# Patient Record
Sex: FEMALE | Race: WHITE | NOT HISPANIC OR LATINO | URBAN - METROPOLITAN AREA
[De-identification: names, ages, dates, MRNs, and addresses within clinical notes are randomized per-mention and may not be internally consistent; named-entity substitution may affect disease eponyms.]

---
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
## 2018-10-01 NOTE — BEHAVIORAL HEALTH ASSESSMENT NOTE - HOMICIDALITY / AGGRESSION (CURRENT/PAST)
ventilation and respiration/gas exchange/gait, locomotion, and balance/aerobic capacity/endurance
None known in lifetime

## 2018-11-28 ENCOUNTER — INPATIENT (INPATIENT)
Facility: HOSPITAL | Age: 18
LOS: 8 days | Discharge: HOME | DRG: 885 | End: 2018-12-07
Attending: PSYCHIATRY & NEUROLOGY | Admitting: PSYCHIATRY & NEUROLOGY
Payer: MEDICAID

## 2018-11-28 VITALS
TEMPERATURE: 98 F | RESPIRATION RATE: 18 BRPM | HEART RATE: 94 BPM | HEIGHT: 64 IN | DIASTOLIC BLOOD PRESSURE: 84 MMHG | WEIGHT: 149.91 LBS | SYSTOLIC BLOOD PRESSURE: 127 MMHG

## 2018-11-28 LAB
ALBUMIN SERPL ELPH-MCNC: 4.9 G/DL — SIGNIFICANT CHANGE UP (ref 3.3–5)
ALP SERPL-CCNC: 92 U/L — SIGNIFICANT CHANGE UP (ref 40–120)
ALT FLD-CCNC: 20 U/L — SIGNIFICANT CHANGE UP (ref 10–45)
ANION GAP SERPL CALC-SCNC: 14 MMOL/L — SIGNIFICANT CHANGE UP (ref 5–17)
APPEARANCE UR: CLEAR — SIGNIFICANT CHANGE UP
AST SERPL-CCNC: 25 U/L — SIGNIFICANT CHANGE UP (ref 10–40)
BASOPHILS NFR BLD AUTO: 0.5 % — SIGNIFICANT CHANGE UP (ref 0–2)
BILIRUB SERPL-MCNC: <0.2 MG/DL — SIGNIFICANT CHANGE UP (ref 0.2–1.2)
BILIRUB UR-MCNC: NEGATIVE — SIGNIFICANT CHANGE UP
BUN SERPL-MCNC: 15 MG/DL — SIGNIFICANT CHANGE UP (ref 7–23)
CALCIUM SERPL-MCNC: 10.1 MG/DL — SIGNIFICANT CHANGE UP (ref 8.4–10.5)
CHLORIDE SERPL-SCNC: 98 MMOL/L — SIGNIFICANT CHANGE UP (ref 96–108)
CO2 SERPL-SCNC: 25 MMOL/L — SIGNIFICANT CHANGE UP (ref 22–31)
COLOR SPEC: YELLOW — SIGNIFICANT CHANGE UP
CREAT SERPL-MCNC: 0.77 MG/DL — SIGNIFICANT CHANGE UP (ref 0.5–1.3)
DIFF PNL FLD: NEGATIVE — SIGNIFICANT CHANGE UP
EOSINOPHIL NFR BLD AUTO: 2.8 % — SIGNIFICANT CHANGE UP (ref 0–6)
GLUCOSE SERPL-MCNC: 95 MG/DL — SIGNIFICANT CHANGE UP (ref 70–99)
GLUCOSE UR QL: NEGATIVE — SIGNIFICANT CHANGE UP
HBA1C BLD-MCNC: 4.7 % — SIGNIFICANT CHANGE UP (ref 4–5.6)
HCG SERPL-ACNC: <.1 MIU/ML — SIGNIFICANT CHANGE UP
HCT VFR BLD CALC: 38.8 % — SIGNIFICANT CHANGE UP (ref 34.5–45)
HGB BLD-MCNC: 13.1 G/DL — SIGNIFICANT CHANGE UP (ref 11.5–15.5)
KETONES UR-MCNC: NEGATIVE — SIGNIFICANT CHANGE UP
LEUKOCYTE ESTERASE UR-ACNC: ABNORMAL
LYMPHOCYTES # BLD AUTO: 25.2 % — SIGNIFICANT CHANGE UP (ref 13–44)
MCHC RBC-ENTMCNC: 29.6 PG — SIGNIFICANT CHANGE UP (ref 27–34)
MCHC RBC-ENTMCNC: 33.8 G/DL — SIGNIFICANT CHANGE UP (ref 32–36)
MCV RBC AUTO: 87.6 FL — SIGNIFICANT CHANGE UP (ref 80–100)
MONOCYTES NFR BLD AUTO: 6.5 % — SIGNIFICANT CHANGE UP (ref 2–14)
NEUTROPHILS NFR BLD AUTO: 65 % — SIGNIFICANT CHANGE UP (ref 43–77)
NITRITE UR-MCNC: NEGATIVE — SIGNIFICANT CHANGE UP
PH UR: 6 — SIGNIFICANT CHANGE UP (ref 5–8)
PLATELET # BLD AUTO: 469 K/UL — HIGH (ref 150–400)
POTASSIUM SERPL-MCNC: 4.5 MMOL/L — SIGNIFICANT CHANGE UP (ref 3.5–5.3)
POTASSIUM SERPL-SCNC: 4.5 MMOL/L — SIGNIFICANT CHANGE UP (ref 3.5–5.3)
PROT SERPL-MCNC: 8.6 G/DL — HIGH (ref 6–8.3)
PROT UR-MCNC: NEGATIVE MG/DL — SIGNIFICANT CHANGE UP
RBC # BLD: 4.43 M/UL — SIGNIFICANT CHANGE UP (ref 3.8–5.2)
RBC # FLD: 12.2 % — SIGNIFICANT CHANGE UP (ref 10.3–16.9)
SODIUM SERPL-SCNC: 137 MMOL/L — SIGNIFICANT CHANGE UP (ref 135–145)
SP GR SPEC: 1.02 — SIGNIFICANT CHANGE UP (ref 1–1.03)
TSH SERPL-MCNC: 0.89 UIU/ML — SIGNIFICANT CHANGE UP (ref 0.35–4.94)
UROBILINOGEN FLD QL: 0.2 E.U./DL — SIGNIFICANT CHANGE UP
WBC # BLD: 8.6 K/UL — SIGNIFICANT CHANGE UP (ref 3.8–10.5)
WBC # FLD AUTO: 8.6 K/UL — SIGNIFICANT CHANGE UP (ref 3.8–10.5)

## 2018-11-28 RX ORDER — LAMOTRIGINE 25 MG/1
225 TABLET, ORALLY DISINTEGRATING ORAL AT BEDTIME
Qty: 0 | Refills: 0 | Status: DISCONTINUED | OUTPATIENT
Start: 2018-11-28 | End: 2018-12-07

## 2018-11-28 RX ORDER — MAGNESIUM HYDROXIDE 400 MG/1
30 TABLET, CHEWABLE ORAL DAILY
Qty: 0 | Refills: 0 | Status: DISCONTINUED | OUTPATIENT
Start: 2018-11-28 | End: 2018-12-07

## 2018-11-28 RX ORDER — IBUPROFEN 200 MG
400 TABLET ORAL THREE TIMES A DAY
Qty: 0 | Refills: 0 | Status: DISCONTINUED | OUTPATIENT
Start: 2018-11-28 | End: 2018-12-07

## 2018-11-28 RX ORDER — HYDROXYZINE HCL 10 MG
25 TABLET ORAL THREE TIMES A DAY
Qty: 0 | Refills: 0 | Status: DISCONTINUED | OUTPATIENT
Start: 2018-11-28 | End: 2018-12-07

## 2018-11-28 RX ORDER — LAMOTRIGINE 25 MG/1
225 TABLET, ORALLY DISINTEGRATING ORAL ONCE
Qty: 0 | Refills: 0 | Status: COMPLETED | OUTPATIENT
Start: 2018-11-28 | End: 2018-11-28

## 2018-11-28 RX ADMIN — LAMOTRIGINE 225 MILLIGRAM(S): 25 TABLET, ORALLY DISINTEGRATING ORAL at 21:44

## 2018-11-29 DIAGNOSIS — F33.9 MAJOR DEPRESSIVE DISORDER, RECURRENT, UNSPECIFIED: ICD-10-CM

## 2018-11-29 DIAGNOSIS — F50.81 BINGE EATING DISORDER: ICD-10-CM

## 2018-11-29 DIAGNOSIS — F60.3 BORDERLINE PERSONALITY DISORDER: ICD-10-CM

## 2018-11-29 PROBLEM — F41.9 ANXIETY DISORDER, UNSPECIFIED: Chronic | Status: ACTIVE | Noted: 2017-09-05

## 2018-11-29 PROBLEM — F32.9 MAJOR DEPRESSIVE DISORDER, SINGLE EPISODE, UNSPECIFIED: Chronic | Status: ACTIVE | Noted: 2017-09-05

## 2018-11-29 LAB
CHOLEST SERPL-MCNC: 201 MG/DL — HIGH (ref 10–199)
HDLC SERPL-MCNC: 41 MG/DL — LOW
LIPID PNL WITH DIRECT LDL SERPL: 116 MG/DL — SIGNIFICANT CHANGE UP
TOTAL CHOLESTEROL/HDL RATIO MEASUREMENT: 4.9 RATIO — SIGNIFICANT CHANGE UP (ref 3.3–7.1)
TRIGL SERPL-MCNC: 222 MG/DL — HIGH (ref 10–149)

## 2018-11-29 PROCEDURE — 93010 ELECTROCARDIOGRAM REPORT: CPT

## 2018-11-29 PROCEDURE — 99233 SBSQ HOSP IP/OBS HIGH 50: CPT

## 2018-11-29 RX ADMIN — LAMOTRIGINE 225 MILLIGRAM(S): 25 TABLET, ORALLY DISINTEGRATING ORAL at 21:32

## 2018-11-29 NOTE — BEHAVIORAL HEALTH ASSESSMENT NOTE - NSBHCHARTREVIEWVS_PSY_A_CORE FT
Vital Signs Last 24 Hrs  T(C): 36.9 (29 Nov 2018 09:30), Max: 36.9 (28 Nov 2018 18:33)  T(F): 98.5 (29 Nov 2018 09:30), Max: 98.5 (29 Nov 2018 09:30)  HR: 71 (29 Nov 2018 09:30) (67 - 94)  BP: 100/65 (29 Nov 2018 09:30) (96/62 - 127/84)  BP(mean): --  RR: 18 (29 Nov 2018 09:30) (18 - 18)  SpO2: --

## 2018-11-29 NOTE — BEHAVIORAL HEALTH ASSESSMENT NOTE - SUMMARY
Patient reports depression for the last 5 years, "life was a mess". 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 The director of the program told her she was "fat", which was the time she left the academy (age 14) and moved to [place] with her family. Her parents were supportive and understanding during her academy stay, reports that she rivera by talking to her parents. While in [place], she tried to audition but kept failing and being rejected. States that it "brought terror to life" and was "scared of the future and not being a dancer". First thought of suicide last year (2017). States that she "just wanted to die" but didn't do anything about it, admitted herself to the hospital ([facility] psych unit). Reports that her parents were in [place] at the time of suicidal ideation in 2017. During her visit last year, she reports being happy here and "away from reality". Pt states she has a binge eating disorder that developed from the dance academy, reports that she eats mounds of food to "fill a void", has tried to vomit but cannot get herself to. Void is fear, fact that wasn't dancing anymore and "hated myself". Pt attended an eating disorder inpatient unit through [facility] after completing her stay here in 2017. States that she does not binge as much anymore. After failing auditions, she continued to dance by herself or with her mother who is a choreographer. States 2 months ago she couldn't physically dance anymore, "grief that I can't describe". Reports that she sees an outpatient psychiatrist , states for the past 2 months she has been telling everyone that she felt suicidal daily and was imagining ways of dying. When asked about this statement, she replies "I don't really want to die but don't want to feel pain anymore". She reports she knew her parents were coming back shortly after the overdose and that they would bring her to the hospital. When asked about the suicide attempt, she states "I wanted to get revenge; I felt the need to hurt myself since I have been hating myself". Pt did not plan the suicide attempt, states it was “impulsive but in my head for so long”. She knew her parents were about to leave for [place] again. States that she felt it was a surprise to her parents but “not really because I’ve been telling everyone I want to die”. States, "I wanted to get help but I did it in a stupid way which I'm regretting. I was relieved when I woke up, happy, I didn't hate myself anymore". 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 States that the families do know that she is in the hospital but concerned about if they message her while she is here that she will disappoint them. When talking about babysitting, states “it’s amazing, I love it so much”. When asked why she enjoys babysitting, she reports it to be “therapeutic, kids are so innocent and young. I can leave my problems behind”. Denies  illicit drug use. Reports consuming 2 alcoholic drinks once a month. States she has 3 good friends in [place] that are in her building and many other friends in [facility] that she is in contact with. Patient believes that a lack of structure is a trigger for her, she used to be very structured at the dance academy but when she left she felt that life fell apart. States that while she has been in the hospital, her parents have helped her with a plan once she leaves here. She will continue to work every day and she will be meeting with a . Parents have agreed to order her meals in order to keep her structured with scheduled eating since pt reports that her binge eating is mostly from snacking. She plans on going to the dance studio with her mother, states it will be an unstructured environment that she can come and go as she pleases and dance as much or as little as she wants. Pt is discharge focused. States that she “feels ready to leave today but I know I need to suffer the consequences for my actions”. Believes her suicide attempt was the “last moment fight that ended the war” referring to the war with hating herself. States “I did something horrible by punishing myself and satisfied that it’s over. The future is open.” and does not feel the need to overdose again. Reports she is “finally at truce with myself”. States her “whole mindset has changed” and believes talking about the incident will help. She is happy with her outpatient psychiatrist and counselor. Reports that she doesn’t think Lamictal is helping but “pills” have not helped her in the past as she states she has tried many including Prozac and Sertraline. States talking helps her the most. She is interested in outpatient eating group/program “just to be safe” because binge eating becomes a problem when things get hectic in her life. Denies SI/HI. Denies AH/VH.

## 2018-11-29 NOTE — BEHAVIORAL HEALTH ASSESSMENT NOTE - NSBHADMITIPSTRENGTH_PSY_A_CORE
In good physical health/Has supportive interpersonal relationships with family, friends or peers/Has access to housing/residential stability/Knowledge of medications/Cooperative with treatment/Steady employment

## 2018-11-29 NOTE — BEHAVIORAL HEALTH ASSESSMENT NOTE - RISK ASSESSMENT
Static: history of borderline personality disorder, history of depression, history of binge eating disorder, history of past psychiatric hospitalizations, history of suidical ideation, history of suicide attempt  Modifiable: mood symptoms, structured eating environment  Protective: supportive family, employment, domiciled, insight into illness

## 2018-11-29 NOTE — BEHAVIORAL HEALTH ASSESSMENT NOTE - DETAILS
Prior suicidal ideation 2017, patient admitted herself to [facility] psych unit. 11/22/18 suicide attempt, overdose on Lamictal and Sertraline, found by parents and brought to hospital. Verbal abuse from director of ballet academy, called patient fat

## 2018-11-29 NOTE — BEHAVIORAL HEALTH ASSESSMENT NOTE - NSBHCHARTREVIEWLAB_PSY_A_CORE FT
Cholesterol <H> 201  Triglycerides <H> 222  HDL <L> 41  Direct   Platelet count <H> 469  Urine- 5-10 WBC, many bacteria, trace leukocytes

## 2018-11-29 NOTE — BEHAVIORAL HEALTH ASSESSMENT NOTE - NSBHCHARTREVIEWIMAGING_PSY_A_CORE FT
[facility] imaging   XR chest- no acute airspace disease  CT head without contrast- no evidence of intracranial hemorrhage, extra-axial fluid collection or territorial infarct

## 2018-11-29 NOTE — BEHAVIORAL HEALTH ASSESSMENT NOTE - HPI (INCLUDE ILLNESS QUALITY, SEVERITY, DURATION, TIMING, CONTEXT, MODIFYING FACTORS, ASSOCIATED SIGNS AND SYMPTOMS)
18 year old single female, domiciled and employed (), history of borderline personality disorder, history of depression, history of binge eating, past psychiatric hospitalizations (voluntary in 2017 for suicidal ideation). Patient was found drowsy and surrounded by vomitus and empty pill bottles (Sertraline/Lamotrigine- dose and number of pills consumed unclear) around 2:30pm on 11/22/18. Had been last seen normal around 1pm. Patient reports that Sertraline was an old medication that had been prescribed to her, had pills left over. States she took "half a handful" of pills. She was brought to the [facility] ED within an hour of being found, all vitals were within normal limits except patient was hypothermic to 95.4F. She was found to have muscle rigidity and clonus. Poison control was contacted and she was administered 50mg Activated charcoal, 8mg Cyproheptadine and 3L of normal saline. During the next few hours after presentation, patient progressively became more drowsy, tachycardic, and tachypneic so decision was made to intubate her for airway protection around 5:30pm on 11/22/18. Patient was admitted to the ICU after being intubated in the ED and given 50mg Activated charcoal, cyproheptadine and 3L of normal saline. She was started on Propofol gtt and then Versed gtt. Post intubation, ABG showed pH 7.22. Utox was negative. Lactic acid peaked at 5.3. The first morning of admission, the patient had minimal mechanical vent requirements, no additional Cyproheptadine was given. Sedation was waned, but patient was not extubated as myoclonus remained prominent. EEG showed no definitive epileptiform discharges. Leukocytosis downtrended appropriately. VBG pH 7.22>7.41>7.41. The patient was extubated her second morning of admission 11/24 without complication. Patient was comfortable on room air post extubation and was encouraged to continue incentive spirometry. Blood cultures were sent as patient met SIRS criteria on 11/22/18- negative to date. Initial HAGMA with lactic acidosis, gap closed. Patient was restarted on Lamictal 225mg PO on 11/27/198. She clinically improved during [facility] stay and was medically stable for discharge to [facility]. See summary for further detail about psychiatric history and admission.

## 2018-11-29 NOTE — CHART NOTE - NSCHARTNOTEFT_GEN_A_CORE
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      On evaluation, patient appears pleasant with a bright affect.  She regrets about her OD.  She is happy that she is alive.  Patient minimized her OD and rationalized it was her way of taking control of her care and crying for help.  Now she feels much better since the family and her outpatient team have come up with structures and plans for her.  Her mood is "happy".  She denies psychotic symptoms.  Sleep and appetite are good.     MSE: Well groomed, pleasant, cooperative with good eye contact.  Mood is "happy", bright affect.   Thought content intact.  Thought process linear, goal directed, No hallucination or delusions.       She had been getting lamictal 225mg bedtime when at [facility].  Will C/W it.

## 2018-11-29 NOTE — BEHAVIORAL HEALTH ASSESSMENT NOTE - NSBHADMITCOUNSEL_PSY_A_CORE
risk factor reduction/risks and benefits of treatment options/diagnostic results/impressions and/or recommended studies/importance of adherence to chosen treatment

## 2018-11-30 PROCEDURE — 99232 SBSQ HOSP IP/OBS MODERATE 35: CPT

## 2018-11-30 RX ADMIN — LAMOTRIGINE 225 MILLIGRAM(S): 25 TABLET, ORALLY DISINTEGRATING ORAL at 21:30

## 2018-11-30 RX ADMIN — Medication 25 MILLIGRAM(S): at 21:30

## 2018-11-30 NOTE — PROGRESS NOTE BEHAVIORAL HEALTH - RISK ASSESSMENT
Static: history of borderline personality disorder, history of depression, history of binge eating disorder, history of past psychiatric hospitalizations, history of suidical ideation, history of suicide attempt  Modifiable: mood symptoms, structured eating environment  Protective: supportive family, employment, domiciled, insight into illness. Static: history of borderline personality disorder, history of depression, history of binge eating disorder, history of past psychiatric hospitalizations, history of suicidal ideation, history of suicide attempt  Modifiable: mood symptoms, structured eating environment  Protective: supportive family, employment, domiciled, insight into illness.

## 2018-12-01 RX ADMIN — LAMOTRIGINE 225 MILLIGRAM(S): 25 TABLET, ORALLY DISINTEGRATING ORAL at 21:22

## 2018-12-01 RX ADMIN — Medication 25 MILLIGRAM(S): at 21:22

## 2018-12-01 NOTE — PROGRESS NOTE BEHAVIORAL HEALTH - RISK ASSESSMENT
Static: history of borderline personality disorder, history of depression, history of binge eating disorder, history of past psychiatric hospitalizations, history of suicidal ideation, history of suicide attempt  Modifiable: mood symptoms, structured eating environment  Protective: supportive family, employment, domiciled, insight into illness.

## 2018-12-02 RX ADMIN — LAMOTRIGINE 225 MILLIGRAM(S): 25 TABLET, ORALLY DISINTEGRATING ORAL at 21:27

## 2018-12-02 RX ADMIN — Medication 25 MILLIGRAM(S): at 21:27

## 2018-12-03 PROCEDURE — 99232 SBSQ HOSP IP/OBS MODERATE 35: CPT

## 2018-12-03 RX ADMIN — LAMOTRIGINE 225 MILLIGRAM(S): 25 TABLET, ORALLY DISINTEGRATING ORAL at 21:16

## 2018-12-03 NOTE — PROGRESS NOTE BEHAVIORAL HEALTH - RISK ASSESSMENT
Risk elements: domiciled with family; suicidality (current/past) yes; voluntary; verbal; employment employed; abuse / trauma history yes; oriented to situation yes; overdose; alcohol yes; -mother; -family;     Static: past suicide attempts; interpersonal stress;; past trauma or abuse; substance abuse/dependance;     Modifiable: treat underlying mood issues; imptove interpersonal effectiveness;; address possible PTSD or trauma issues monitor for flashbacks or nightmares; assess/treat underlying mood issues; substance abuse counselling and treatment and need for referral; assist engaging possible support network;     Protective: involved family; seeks help; has job or job skills; cognitively able to engage in treatment; may have involved family; review for degree of family engagement;   Modifiable factors addressed in treatment plan; see summary and interval data for updates

## 2018-12-04 PROCEDURE — 99232 SBSQ HOSP IP/OBS MODERATE 35: CPT

## 2018-12-04 RX ADMIN — Medication 25 MILLIGRAM(S): at 21:28

## 2018-12-04 RX ADMIN — LAMOTRIGINE 225 MILLIGRAM(S): 25 TABLET, ORALLY DISINTEGRATING ORAL at 21:28

## 2018-12-05 PROCEDURE — 99232 SBSQ HOSP IP/OBS MODERATE 35: CPT

## 2018-12-05 RX ADMIN — Medication 25 MILLIGRAM(S): at 22:04

## 2018-12-05 RX ADMIN — Medication 400 MILLIGRAM(S): at 22:03

## 2018-12-05 RX ADMIN — LAMOTRIGINE 225 MILLIGRAM(S): 25 TABLET, ORALLY DISINTEGRATING ORAL at 22:03

## 2018-12-05 RX ADMIN — Medication 400 MILLIGRAM(S): at 22:04

## 2018-12-06 PROCEDURE — 99232 SBSQ HOSP IP/OBS MODERATE 35: CPT

## 2018-12-06 RX ADMIN — LAMOTRIGINE 225 MILLIGRAM(S): 25 TABLET, ORALLY DISINTEGRATING ORAL at 21:24

## 2018-12-06 RX ADMIN — Medication 25 MILLIGRAM(S): at 21:24

## 2018-12-07 VITALS
DIASTOLIC BLOOD PRESSURE: 85 MMHG | RESPIRATION RATE: 19 BRPM | SYSTOLIC BLOOD PRESSURE: 126 MMHG | HEART RATE: 76 BPM | TEMPERATURE: 98 F

## 2018-12-07 PROCEDURE — 99238 HOSP IP/OBS DSCHRG MGMT 30/<: CPT

## 2018-12-07 PROCEDURE — 90853 GROUP PSYCHOTHERAPY: CPT

## 2018-12-07 RX ORDER — LAMOTRIGINE 25 MG/1
9 TABLET, ORALLY DISINTEGRATING ORAL
Qty: 135 | Refills: 0 | OUTPATIENT
Start: 2018-12-07 | End: 2018-12-21

## 2018-12-07 NOTE — PROGRESS NOTE BEHAVIORAL HEALTH - PROBLEM SELECTOR PLAN 3
See interval history and summary for further detail.

## 2018-12-07 NOTE — PROGRESS NOTE BEHAVIORAL HEALTH - NSBHATTESTSEENBY_PSY_A_CORE
Trainee with telephonic supervision from Attending Psychiatrist
Trainee with telephonic supervision from Attending Psychiatrist
attending Psychiatrist without NP/Trainee

## 2018-12-07 NOTE — PROGRESS NOTE BEHAVIORAL HEALTH - NSBHFUPINTERVALHXFT_PSY_A_CORE
continues to appear "bright " and visible;  MSE: attending groups; lying in bed but rousale;  smiles occassionally; generally cheerful but anxious .  ; fair eye contact.  clean.   cognition is intact; speech normal volume, spontaneous, clear.   no tremor or rigidity.  mood anxious; affect anxious and normal range of affect;  .  mildly anxious mood and affect; affect generally  thought congruent; tc/tp no peculiarities of language use; i&j: fair to poor; accepts treatment; however not reflective on sxs or situation in a meaningful manner.  rather superficial.
No acute events overnight. Patient assessed at bedside per her request. She slept "good" and breakfast was "fine." She stated "I want to get out and start my new structure that my parents set up for me." She denied questions/concerns for MD. Denied Si, HI, AVH, medication side effects.
No acute events overnight. Patient assessed at bedside. patient says that ever since she woke up at the other hosopital, she has felt stable. She explained that this hospitalization is a time for her to take a step back and rest, before fany gback into the world with the structure she has made for herself. She denies any depressive feelings or suicidal thoughts. She has been going to groups and eating meals with good appetite. She has no complaints today, although continues to say that the medication does nothing for her.
continues to appear "bright " and visible;  MSE: attending groups; generally cheerful but anxious .  ; fair eye contact.  clean.   cognition is intact; speech normal volume, spontaneous, clear.   no tremor or rigidity.  mood anxious; affect anxious and normal range of affect;  .  mildly anxious mood and affect; affect generally  thought congruent; tc/tp no peculiarities of language use; i&j: fair to poor; accepts treatment; however not reflective on sxs or situation in a meaningful manner.  rather superficial.
continues to appear "bright " and visible;  MSE: in the conference room with the assigned psychologist.  ; fair eye contact.  clean.   cognition is intact; speech normal volume, spontaneous, clear.   no tremor or rigidity.  mood anxious; affect anxious and normal range of affect;  .  mildly anxious mood and affect; affect generally  thought congruent; tc/tp no peculiarities of language use; i&j: fair to poor; accepts treatment; however not reflective on sxs or situation in a meaningful manner.  rather superficial.
"bright " and visible; participated in conference.   MSE: attending groups; spends time with peers; a little anxious; pleasant. ;  smiles occassionally; generally cheerful but anxious .  ; fair eye contact.  clean.   cognition is intact; speech normal volume, spontaneous, clear.   no tremor or rigidity.  mood anxious; affect anxious and normal range of affect;  .  mildly anxious mood and affect; affect generally  thought congruent; tc/tp no peculiarities of language use; i&j: fair to poor; accepts treatment; however not reflective on sxs or situation in a meaningful manner.  rather superficial.      ;;
"bright " and visible; participated in conference.   MSE: attending groups; spends time with peers; a little anxious; pleasant. ;  smiles occassionally; generally cheerful but anxious .  ; fair eye contact.  clean.   cognition is intact; speech normal volume, spontaneous, clear.   no tremor or rigidity.  mood anxious; affect anxious and normal range of affect;  .  mildly anxious mood and affect; affect generally  thought congruent; tc/tp no peculiarities of language use; i&j: fair to poor; accepts treatment; however not reflective on sxs or situation in a meaningful manner.  rather superficial.
Admission note- Patient reports depression for the last 5 years, "life was a mess". 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 She attended a ballet academy in [facility] from age 11 to 14. The director of the program told her she was "fat", which was the time she left the academy (age 14) and moved to [place] with her family. Her parents were supportive and understanding during her academy stay, reports that she rivera by talking to her parents. While in [place], she tried to audition but kept failing and being rejected. States that it "brought terror to life" and was "scared of the future and not being a dancer". First thought of suicide last year (2017). States that she "just wanted to die" but didn't do anything about it, admitted herself to the hospital ([facility] psych unit). Reports that her parents were in [place] at the time of suicidal ideation in 2017. During her visit last year, she reports being happy here and "away from reality". Pt states she has a binge eating disorder that developed from the dance academy, reports that she eats mounds of food to "fill a void", has tried to vomit but cannot get herself to. Void is fear, fact that wasn't dancing anymore and "hated myself". Pt attended an eating disorder inpatient unit through [facility] after completing her stay here in 2017. States that she does not binge as much anymore. After failing auditions, she continued to dance by herself or with her mother who is a choreographer. States 2 months ago she couldn't physically dance anymore, "grief that I can't describe". Reports that she sees an outpatient psychiatrist , states for the past 2 months she has been telling everyone that she felt suicidal daily and was imagining ways of dying. When asked about this statement, she replies "I don't really want to die but don't want to feel pain anymore". She reports she knew her parents were coming back shortly after the overdose and that they would bring her to the hospital. When asked about the suicide attempt, she states "I wanted to get revenge; I felt the need to hurt myself since I have been hating myself". Pt did not plan the suicide attempt, states it was “impulsive but in my head for so long”. She knew her parents were about to leave for [place] again. States that she felt it was a surprise to her parents but “not really because I’ve been telling everyone I want to die”. States, "I wanted to get help but I did it in a stupid way which I'm regretting. I was relieved when I woke up, happy, I didn't hate myself anymore". 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 States that the families do know that she is in the hospital but concerned about if they message her while she is here that she will disappoint them. When talking about babysitting, states “it’s amazing, I love it so much”. When asked why she enjoys babysitting, she reports it to be “therapeutic, kids are so innocent and young. I can leave my problems behind”. Denies  illicit drug use. Reports consuming 2 alcoholic drinks once a month. States she has 3 good friends in [place] that are in her building and many other friends in [facility] that she is in contact with. Patient believes that a lack of structure is a trigger for her, she used to be very structured at the dance academy but when she left she felt that life fell apart. States that while she has been in the hospital, her parents have helped her with a plan once she leaves here. She will continue to work every day and she will be meeting with a . Parents have agreed to order her meals in order to keep her structured with scheduled eating since pt reports that her binge eating is mostly from snacking. She plans on going to the dance studio with her mother, states it will be an unstructured environment that she can come and go as she pleases and dance as much or as little as she wants. Pt is discharge focused. States that she “feels ready to leave today but I know I need to suffer the consequences for my actions”. Believes her suicide attempt was the “last moment fight that ended the war” referring to the war with hating herself. States “I did something horrible by punishing myself and satisfied that it’s over. The future is open.” and does not feel the need to overdose again. Reports she is “finally at truce with myself”. States her “whole mindset has changed” and believes talking about the incident will help. She is happy with her outpatient psychiatrist and counselor. Reports that she doesn’t think Lamictal is helping but “pills” have not helped her in the past as she states she has tried many including Prozac and Sertraline. States talking helps her the most. She is interested in outpatient eating group/program “just to be safe” because binge eating becomes a problem when things get hectic in her life. Denies SI/HI. Denies AH/VH.

## 2018-12-07 NOTE — PROGRESS NOTE BEHAVIORAL HEALTH - NSBHPTASSESSDT_PSY_A_CORE
01-Dec-2018 10:30
02-Dec-2018 11:00
03-Dec-2018 10:43
04-Dec-2018 14:28
06-Dec-2018 10:16
07-Dec-2018 11:03
30-Nov-2018 09:00
05-Dec-2018 09:04

## 2018-12-07 NOTE — PROGRESS NOTE BEHAVIORAL HEALTH - NSBHCHARTREVIEWVS_PSY_A_CORE FT
Vital Signs Last 24 Hrs  T(C): 36.4 (04 Dec 2018 16:17), Max: 36.8 (04 Dec 2018 06:23)  T(F): 97.6 (04 Dec 2018 16:17), Max: 98.2 (04 Dec 2018 06:23)  HR: 80 (04 Dec 2018 16:17) (79 - 84)  BP: 125/77 (04 Dec 2018 16:17) (91/53 - 125/77)  BP(mean): --  RR: 18 (04 Dec 2018 16:17) (18 - 18)  SpO2: --
Vital Signs Last 24 Hrs  T(C): 36.4 (04 Dec 2018 16:17), Max: 36.8 (04 Dec 2018 06:23)  T(F): 97.6 (04 Dec 2018 16:17), Max: 98.2 (04 Dec 2018 06:23)  HR: 80 (04 Dec 2018 16:17) (79 - 84)  BP: 125/77 (04 Dec 2018 16:17) (91/53 - 125/77)  BP(mean): --  RR: 18 (04 Dec 2018 16:17) (18 - 18)  SpO2: --
Vital Signs Last 24 Hrs  T(C): 36.8 (01 Dec 2018 09:34), Max: 36.9 (30 Nov 2018 20:58)  T(F): 98.3 (01 Dec 2018 09:34), Max: 98.4 (30 Nov 2018 20:58)  HR: 75 (01 Dec 2018 09:34) (75 - 89)  BP: 98/61 (01 Dec 2018 09:34) (98/61 - 115/76)  BP(mean): --  RR: 20 (01 Dec 2018 09:34) (18 - 20)  SpO2: --
Vital Signs Last 24 Hrs  T(C): 36.7 (30 Nov 2018 06:46), Max: 36.8 (29 Nov 2018 20:58)  T(F): 98.1 (30 Nov 2018 06:46), Max: 98.2 (29 Nov 2018 20:58)  HR: 84 (30 Nov 2018 06:46) (83 - 88)  BP: 110/71 (30 Nov 2018 06:46) (109/70 - 115/72)  BP(mean): --  RR: 18 (30 Nov 2018 06:46) (18 - 18)  SpO2: --
wnl --
Vital Signs Last 24 Hrs  T(C): 36.4 (04 Dec 2018 16:17), Max: 36.8 (04 Dec 2018 06:23)  T(F): 97.6 (04 Dec 2018 16:17), Max: 98.2 (04 Dec 2018 06:23)  HR: 80 (04 Dec 2018 16:17) (79 - 84)  BP: 125/77 (04 Dec 2018 16:17) (91/53 - 125/77)  BP(mean): --  RR: 18 (04 Dec 2018 16:17) (18 - 18)  SpO2: --

## 2018-12-07 NOTE — PROGRESS NOTE BEHAVIORAL HEALTH - PRIMARY DX
Episode of recurrent major depressive disorder, unspecified depression episode severity

## 2018-12-07 NOTE — PROGRESS NOTE BEHAVIORAL HEALTH - NSBHADMITDANGERSELF_PSY_A_CORE
suicidal ideation with plan and means

## 2018-12-07 NOTE — PROGRESS NOTE BEHAVIORAL HEALTH - SUMMARY
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   On evaluation, patient appears pleasant with a bright affect.  She regrets about her OD.  She is happy that she is alive.  Patient minimized her OD and rationalized it was her way of taking control of her care and crying for help.  Now she feels much better since the family and her outpatient team have come up with structures and plans for her.  Her mood is "happy".  She denies psychotic symptoms.  Sleep and appetite are good.  MSE: Well groomed, pleasant, cooperative with good eye contact.  Mood is "happy", bright affect.   Thought content intact.  Thought process linear, goal directed, No hallucination or delusions.   She had been getting lamictal 225mg bedtime when at [facility].  Will C/W it.    ;;11/29: anxious but no active SI.  No insight into overdose and possible relationship to parents going away on a trip.    ;;11/30: On Lamictal 225mg a day to prevent depressed mood; no SI today;  may begin transition to aftercare if stable.   ;;12/3: anticipating aftercare now that parents are back;  more anxious not sad;  no SI;    ;;12/4: attends groups; anxious but no SI.   ;;12/5: continues anxious but no SI.   ;;12/6: participated in conference; waiting for Eating Disorders Program; in good behavioral control.   ;;12/7: bright and cheerful;  no SI; affect generally thought congruent; appropriate for d/c today.
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   On evaluation, patient appears pleasant with a bright affect.  She regrets about her OD.  She is happy that she is alive.  Patient minimized her OD and rationalized it was her way of taking control of her care and crying for help.  Now she feels much better since the family and her outpatient team have come up with structures and plans for her.  Her mood is "happy".  She denies psychotic symptoms.  Sleep and appetite are good.  MSE: Well groomed, pleasant, cooperative with good eye contact.  Mood is "happy", bright affect.   Thought content intact.  Thought process linear, goal directed, No hallucination or delusions.   She had been getting lamictal 225mg bedtime when at [facility].  Will C/W it.    ;;11/29: anxious but no active SI.  No insight into overdose and possible relationship to parents going away on a trip.    ;;11/30: On Lamictal 225mg a day to prevent depressed mood; no SI today;  may begin transition to aftercare if stable.   ;;12/3: anticipating aftercare now that parents are back;  more anxious not sad;  no SI;    ;;12/4: attends groups; anxious but no SI.   ;;12/5: continues anxious but no SI.   ;;12/6: participated in conference; waiting for Eating Disorders Program; in good behavioral control.
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   On evaluation, patient appears pleasant with a bright affect.  She regrets about her OD.  She is happy that she is alive.  Patient minimized her OD and rationalized it was her way of taking control of her care and crying for help.  Now she feels much better since the family and her outpatient team have come up with structures and plans for her.  Her mood is "happy".  She denies psychotic symptoms.  Sleep and appetite are good.  MSE: Well groomed, pleasant, cooperative with good eye contact.  Mood is "happy", bright affect.   Thought content intact.  Thought process linear, goal directed, No hallucination or delusions.   She had been getting lamictal 225mg bedtime when at [facility].  Will C/W it.    ;;11/29: anxious but no active SI.  No insight into overdose and possible relationship to parents going away on a trip.    ;;11/30: On Lamictal 225mg a day to prevent depressed mood; no SI today;  may begin transition to aftercare if stable.   ;;12/3: anticipating aftercare now that parents are back;  more anxious not sad;  no SI;    ;;12/4: attends groups; anxious but no SI.
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   On evaluation, patient appears pleasant with a bright affect.  She regrets about her OD.  She is happy that she is alive.  Patient minimized her OD and rationalized it was her way of taking control of her care and crying for help.  Now she feels much better since the family and her outpatient team have come up with structures and plans for her.  Her mood is "happy".  She denies psychotic symptoms.  Sleep and appetite are good.  MSE: Well groomed, pleasant, cooperative with good eye contact.  Mood is "happy", bright affect.   Thought content intact.  Thought process linear, goal directed, No hallucination or delusions.   She had been getting lamictal 225mg bedtime when at [facility].  Will C/W it.    ;;11/29: anxious but no active SI.  No insight into overdose and possible relationship to parents going away on a trip.    ;;11/30: On Lamictal 225mg a day to prevent depressed mood; no SI today;  may begin transition to aftercare if stable.   ;;12/3: anticipating aftercare now that parents are back;  more anxious not sad;  no SI;
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   On evaluation, patient appears pleasant with a bright affect.  She regrets about her OD.  She is happy that she is alive.  Patient minimized her OD and rationalized it was her way of taking control of her care and crying for help.  Now she feels much better since the family and her outpatient team have come up with structures and plans for her.  Her mood is "happy".  She denies psychotic symptoms.  Sleep and appetite are good.  MSE: Well groomed, pleasant, cooperative with good eye contact.  Mood is "happy", bright affect.   Thought content intact.  Thought process linear, goal directed, No hallucination or delusions.   She had been getting lamictal 225mg bedtime when at [facility].  Will C/W it.    ;;11/29: anxious but no active SI.  No insight into overdose and possible relationship to parents going away on a trip.    ;;11/30: On Lamictal 225mg a day to prevent depressed mood; no SI today;  may begin transition to aftercare if stable.   ;;12/3: anticipating aftercare now that parents are back;  more anxious not sad;  no SI;    ;;12/4: attends groups; anxious but no SI.   ;;12/5: continues anxious but no SI.
Per nursing note: alert and full range affect, spending time socializing with peers. "I feel much better about myself". Accepted scheduled medications. Reports that she is feeling better about herself when she wakes up in the morning which is something she hasn't felt before. States she wakes up stable and accepting of herself, feels she can handle a negative comment if it were thrown her way because she has "confidence in myself". Reports playing [employer] last night and has been having a good time attending groups. States she goes to groups because she likes interacting with people, especially those who will respond to her and those who are less guarded. Attended a group this morning that had her draw an image of her "happy place", reports that she daiana an open field/park area in [place] that she loves going to so she can look at the clouds and lay in the grass. Talks about being worried about the upcoming plans for her, does not know how long she will be here and is looking for a structure to her discharge. Discussed a continuation of attending groups and being visible, she agrees to plan.
Per nursing note: alert and full range affect, spending time socializing with peers. "I feel much better about myself". Accepted scheduled medications. Reports that she is feeling better about herself when she wakes up in the morning which is something she hasn't felt before. States she wakes up stable and accepting of herself, feels she can handle a negative comment if it were thrown her way because she has "confidence in myself". Reports playing [employer] last night and has been having a good time attending groups. States she goes to groups because she likes interacting with people, especially those who will respond to her and those who are less guarded. Attended a group this morning that had her draw an image of her "happy place", reports that she daiana an open field/park area in [place] that she loves going to so she can look at the clouds and lay in the grass. Talks about being worried about the upcoming plans for her, does not know how long she will be here and is looking for a structure to her discharge. Discussed a continuation of attending groups and being visible, she agrees to plan.
Per nursing note: alert and full range affect, spending time socializing with peers. "I feel much better about myself". Accepted scheduled medications. Reports that she is feeling better about herself when she wakes up in the morning which is something she hasn't felt before. States she wakes up stable and accepting of herself, feels she can handle a negative comment if it were thrown her way because she has "confidence in myself". Reports playing [employer] last night and has been having a good time attending groups. States she goes to groups because she likes interacting with people, especially those who will respond to her and those who are less guarded. Attended a group this morning that had her draw an image of her "happy place", reports that she daiana an open field/park area in [place] that she loves going to so she can look at the clouds and lay in the grass. Talks about being worried about the upcoming plans for her, does not know how long she will be here and is looking for a structure to her discharge. Discussed a continuation of attending groups and being visible, she agrees to plan.

## 2018-12-07 NOTE — PROGRESS NOTE BEHAVIORAL HEALTH - NSBHFUPINTERVALCCFT_PSY_A_CORE
"I'm doing fine, feeling stable"
"I'm doing really well."
Wants to go home for holiday of [facility].   Family is back.  Feels ok.  no sleep appetite or pain issues.
sleep appetite good; no pain issues; mood is bright.
cheerful but anxious; sleep appetite ok  no pain.  superficial   no clear planning .
Looks forward to being with her family; sleep appetite ok;  feels cheerful;  no pain issues.
"I'm stable" no medical complaints, no pain, sleeping well.
anxious to go home but willing to wait for placement in a special program (Eating Disorders).  no sleep appetite or pain complaints.

## 2018-12-07 NOTE — PROGRESS NOTE BEHAVIORAL HEALTH - SECONDARY DX2
Binge eating disorder

## 2018-12-07 NOTE — PROGRESS NOTE BEHAVIORAL HEALTH - PROBLEM SELECTOR PLAN 2
Continue to provide structured eating environment.

## 2018-12-07 NOTE — PROGRESS NOTE BEHAVIORAL HEALTH - PROBLEM SELECTOR PLAN 1
Continue Lamictal 225mg PO daily.   See interval history and summary for further detail.

## 2018-12-07 NOTE — PROGRESS NOTE BEHAVIORAL HEALTH - NSBHADMITIPOBSFT_PSY_A_CORE
makes needs known

## 2018-12-10 DIAGNOSIS — F32.9 MAJOR DEPRESSIVE DISORDER, SINGLE EPISODE, UNSPECIFIED: ICD-10-CM

## 2018-12-10 DIAGNOSIS — F60.3 BORDERLINE PERSONALITY DISORDER: ICD-10-CM

## 2018-12-10 DIAGNOSIS — F50.81 BINGE EATING DISORDER: ICD-10-CM

## 2018-12-10 DIAGNOSIS — F33.9 MAJOR DEPRESSIVE DISORDER, RECURRENT, UNSPECIFIED: ICD-10-CM

## 2019-01-09 PROCEDURE — 85025 COMPLETE CBC W/AUTO DIFF WBC: CPT

## 2019-01-09 PROCEDURE — 84443 ASSAY THYROID STIM HORMONE: CPT

## 2019-01-09 PROCEDURE — 80053 COMPREHEN METABOLIC PANEL: CPT

## 2019-01-09 PROCEDURE — 84702 CHORIONIC GONADOTROPIN TEST: CPT

## 2019-01-09 PROCEDURE — 83036 HEMOGLOBIN GLYCOSYLATED A1C: CPT

## 2019-01-09 PROCEDURE — 80061 LIPID PANEL: CPT

## 2019-01-09 PROCEDURE — 36415 COLL VENOUS BLD VENIPUNCTURE: CPT

## 2019-01-09 PROCEDURE — 93005 ELECTROCARDIOGRAM TRACING: CPT

## 2019-01-09 PROCEDURE — 81001 URINALYSIS AUTO W/SCOPE: CPT

## 2022-01-01 NOTE — ED BEHAVIORAL HEALTH ASSESSMENT NOTE - NS ED BHA MSE GENERAL APPEARANCE
Please see your pediatrician in 1-2 days for their first check up. This appointment is very important. The pediatrician will check to be sure that your baby is not losing too much weight, is staying hydrated, is not having jaundice and is continuing to do well. Well developed

## 2022-04-15 NOTE — PROGRESS NOTE BEHAVIORAL HEALTH - PROBLEM SELECTOR PROBLEM 5
Care Transitions Program Week 5 Follow-up Call    Current Issues/Problems reviewed on call:     Patient stated that no SOB, cough, fever, N/V. Chronic neuropathy pain. No falls since last documented. She has GI appt in May. Appetite is very good and BMs are normal. No questions or concerns. Case closed.    Details of Interventions/Education completed on call:     Review of Systems:   Care Transition System Evaluation:    General Symptoms:    Fever: is not present  Temperature:    Pain:    Pain Location:    Respiratory Symptoms:    Shortness of Breath:    Cardiovascular Symptoms:    Hours of Uninterrupted Sleep:    Sleeping Behaviors:    GI Symptoms:    Abdominal Tenderness:     Symptoms:    Bowel Ostomy Type:    Genitourinary Symptoms:    Urinary Elimination:    Feeding Tube Type:    Skin Symptoms:    Wound Type:      The patient istaking all medications as prescribed. The patient did not have questions or concerns regarding the medications prescribed.    Transitional Care Management (TCM) appointment was completed.  TCM appointment plan of care and upcoming appointments were reviewed with patient.  Transportation to upcoming appointments to be provided by significant other.      
Anxiety

## 2023-02-02 ENCOUNTER — HOSPITAL ENCOUNTER (EMERGENCY)
Dept: HOSPITAL 54 - ER | Age: 23
Discharge: HOME | End: 2023-02-02
Payer: COMMERCIAL

## 2023-02-02 VITALS — BODY MASS INDEX: 23.22 KG/M2 | HEIGHT: 64 IN | WEIGHT: 136 LBS

## 2023-02-02 VITALS — SYSTOLIC BLOOD PRESSURE: 115 MMHG | DIASTOLIC BLOOD PRESSURE: 75 MMHG

## 2023-02-02 DIAGNOSIS — J06.9: Primary | ICD-10-CM

## 2023-02-02 DIAGNOSIS — R19.7: ICD-10-CM

## 2023-02-02 DIAGNOSIS — Z79.899: ICD-10-CM

## 2023-02-02 NOTE — NUR
TO ER BED 7. BIBS C/O SHORTNESS OF BREATH, COUGHING, DIARRHEA, AND CHEST PAIN 
FROM COUGHING X1DAY, PT IS AFEBRILE. AWAITING MD ORDERS.

## 2023-07-17 NOTE — PROGRESS NOTE BEHAVIORAL HEALTH - NSBHLEGALSTATUS_PSY_A_CORE
Can you review. This needs to be done today. Pt has appt on 7/21/23 and we still have to get the [facility] referral approved.   
Referral needs dx code confirmed, to MD for review/approval _DN  
9.13 (Voluntary)

## 2023-09-08 NOTE — PROGRESS NOTE BEHAVIORAL HEALTH - PERCEPTIONS
[Follow-Up] : a follow-up visit
No abnormalities

## 2025-04-04 NOTE — ED BEHAVIORAL HEALTH ASSESSMENT NOTE - NS ED BHA PLAN ADMIT TO PSYCHIATRY ADMIT TO
[facility]